# Patient Record
Sex: FEMALE | URBAN - METROPOLITAN AREA
[De-identification: names, ages, dates, MRNs, and addresses within clinical notes are randomized per-mention and may not be internally consistent; named-entity substitution may affect disease eponyms.]

---

## 2017-03-21 ENCOUNTER — EMERGENCY (EMERGENCY)
Facility: HOSPITAL | Age: 57
LOS: 1 days | Discharge: HOME | End: 2017-03-21
Attending: EMERGENCY MEDICINE
Payer: COMMERCIAL

## 2017-03-21 VITALS
HEIGHT: 59.5 IN | WEIGHT: 123.02 LBS | SYSTOLIC BLOOD PRESSURE: 134 MMHG | HEART RATE: 66 BPM | TEMPERATURE: 98 F | OXYGEN SATURATION: 97 % | DIASTOLIC BLOOD PRESSURE: 78 MMHG | RESPIRATION RATE: 20 BRPM

## 2017-03-21 DIAGNOSIS — Z91.018 ALLERGY TO OTHER FOODS: ICD-10-CM

## 2017-03-21 DIAGNOSIS — S61.230A PUNCTURE WOUND WITHOUT FOREIGN BODY OF RIGHT INDEX FINGER WITHOUT DAMAGE TO NAIL, INITIAL ENCOUNTER: ICD-10-CM
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
## 2017-03-21 PROCEDURE — 80053 COMPREHEN METABOLIC PANEL: CPT

## 2017-03-21 PROCEDURE — 90715 TDAP VACCINE 7 YRS/> IM: CPT

## 2017-03-21 PROCEDURE — 85027 COMPLETE CBC AUTOMATED: CPT

## 2017-03-21 PROCEDURE — 99284 EMERGENCY DEPT VISIT MOD MDM: CPT

## 2017-03-21 PROCEDURE — 80074 ACUTE HEPATITIS PANEL: CPT

## 2017-03-21 PROCEDURE — 36415 COLL VENOUS BLD VENIPUNCTURE: CPT

## 2017-03-21 PROCEDURE — 96372 THER/PROPH/DIAG INJ SC/IM: CPT

## 2017-03-21 PROCEDURE — 86703 HIV-1/HIV-2 1 RESULT ANTBDY: CPT

## 2017-03-21 PROCEDURE — 90471 IMMUNIZATION ADMIN: CPT

## 2017-03-21 PROCEDURE — 90746 HEPB VACCINE 3 DOSE ADULT IM: CPT

## 2017-03-21 PROCEDURE — 99053 MED SERV 10PM-8AM 24 HR FAC: CPT

## 2017-03-21 PROCEDURE — 99284 EMERGENCY DEPT VISIT MOD MDM: CPT | Mod: 25

## 2017-03-21 PROCEDURE — 90371 HEP B IG IM: CPT

## 2017-03-21 RX ORDER — TETANUS TOXOID, REDUCED DIPHTHERIA TOXOID AND ACELLULAR PERTUSSIS VACCINE, ADSORBED 5; 2.5; 8; 8; 2.5 [IU]/.5ML; [IU]/.5ML; UG/.5ML; UG/.5ML; UG/.5ML
0.5 SUSPENSION INTRAMUSCULAR ONCE
Qty: 0 | Refills: 0 | Status: COMPLETED | OUTPATIENT
Start: 2017-03-21 | End: 2017-03-21

## 2017-03-21 RX ORDER — HEPATITIS B VIRUS VACCINE,RECB 20 MCG/ML
10 VIAL (ML) INTRAMUSCULAR ONCE
Qty: 0 | Refills: 0 | Status: COMPLETED | OUTPATIENT
Start: 2017-03-21 | End: 2017-03-21

## 2017-03-21 RX ORDER — HEPATITIS B IMMUNE GLOBULIN (HUMAN) 1560 [IU]/5ML
3.4 LIQUID INTRAMUSCULAR ONCE
Qty: 0 | Refills: 0 | Status: COMPLETED | OUTPATIENT
Start: 2017-03-21 | End: 2017-03-21

## 2017-03-21 RX ADMIN — HEPATITIS B IMMUNE GLOBULIN (HUMAN) 3.4 MILLILITER(S): 1560 LIQUID INTRAMUSCULAR at 10:09

## 2017-03-21 RX ADMIN — TETANUS TOXOID, REDUCED DIPHTHERIA TOXOID AND ACELLULAR PERTUSSIS VACCINE, ADSORBED 0.5 MILLILITER(S): 5; 2.5; 8; 8; 2.5 SUSPENSION INTRAMUSCULAR at 09:57

## 2017-03-21 RX ADMIN — Medication 10 MICROGRAM(S): at 10:00

## 2017-03-21 NOTE — ED PROVIDER NOTE - NS ED MD SCRIBE ATTENDING SCRIBE SECTIONS
HISTORY OF PRESENT ILLNESS/VITAL SIGNS( Pullset)/PHYSICAL EXAM/PAST MEDICAL/SURGICAL/SOCIAL HISTORY/DISPOSITION/REVIEW OF SYSTEMS/HIV

## 2017-03-21 NOTE — ED PROVIDER NOTE - OBJECTIVE STATEMENT
55 y/o female with no significant PMHx presents to the ED for fingerstick 1 week ago. 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 Pt reported incident to her employer who advised pt to come to the ED for Hep vaccine. Pt states she was pricked to her R index finger. Pt denies fever, chills, CP, abd pain, n/v/d, or any other complaints. NKDA. Tetanus shot not UTD. 55 y/o female with no significant PMHx presents to the ED for fingerstick 1 week ago. 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 Pt reported incident to her employer who advised pt to come to the ED for Hep bilaterally vaccine. Pt states she was pricked to her R index finger. Pt denies fever, chills, CP, abd pain, n/v/d, or any other complaints. NKDA. Tetanus shot not UTD. Patient does not think she received hepatitisB vaccine in the past

## 2017-03-21 NOTE — ED PROVIDER NOTE - MEDICAL DECISION MAKING DETAILS
Pt with fingerstick 1 week ago. Will start hepatitis series and give tetanus, too late for HIV prophylaxis. Pt with fingerstick 1 week ago. Will start hepatitis series and give tetanus, too late for HIV prophylaxis. Patient instructed to receive 2nd and 3rd doses of hepatitisB vaccine at 1 and 6 months

## 2020-10-16 ENCOUNTER — APPOINTMENT (OUTPATIENT)
Dept: CT IMAGING | Facility: CLINIC | Age: 60
End: 2020-10-16

## 2020-10-16 ENCOUNTER — OUTPATIENT (OUTPATIENT)
Dept: OUTPATIENT SERVICES | Facility: HOSPITAL | Age: 60
LOS: 1 days | End: 2020-10-16

## 2023-03-23 NOTE — ED ADULT TRIAGE NOTE - NS ED NURSE BANDS TYPE
[facility]        NAME: Keena Winston is a 43 y o  female  : 1984    MRN: [id number]  DATE: 2023  TIME: 3:46 PM    Assessment and Plan   Dysuria [R30 0]  1  Dysuria  cephalexin (KEFLEX) 500 mg capsule    POCT urine dip    Urine culture        UA unremarkable however sx are consistent with UTI will start on keflex and send for culture  Patient Instructions     Continue to monitor symptoms  If new or worsening symptoms develop, go immediately to Er  Drink plenty of fluids  Follow up with Family Doctor this week  Chief Complaint     Chief Complaint   Patient presents with   • Possible UTI     States "not peeing" blood  in urine  Increased urgency and frequency  Symptoms present for last week  History of Present Illness       Difficulty Urinating   This is a new problem  The current episode started yesterday  The problem occurs every urination  The problem has been unchanged  The quality of the pain is described as burning  The pain is mild  There has been no fever  Associated symptoms include frequency, hematuria (One episode  Pt not on  menses) and urgency  Pertinent negatives include no chills, flank pain, nausea or vomiting  She has tried nothing for the symptoms  Has had UTIs in the past and this feels identical       Review of Systems   Review of Systems   Constitutional: Negative  Negative for chills, fatigue and fever  HENT: Negative  Eyes: Negative  Respiratory: Negative  Cardiovascular: Negative  Gastrointestinal: Negative for abdominal pain, constipation, diarrhea, nausea and vomiting  Endocrine: Negative  Genitourinary: Positive for dysuria, frequency, hematuria (One episode  Pt not on  menses) and urgency  Negative for flank pain, pelvic pain, vaginal bleeding, vaginal discharge and vaginal pain  Musculoskeletal: Negative for back pain, gait problem, neck pain and neck stiffness  Skin: Negative  Negative for pallor and rash  Allergic/Immunologic: Negative  Neurological: Negative  Negative for weakness and numbness  Hematological: Negative  Psychiatric/Behavioral: Negative  Current Medications       Current Outpatient Medications:   •  cephalexin (KEFLEX) 500 mg capsule, Take 1 capsule (500 mg total) by mouth every 12 (twelve) hours for 7 days, Disp: 14 capsule, Rfl: 0  •  hydrOXYzine HCL (ATARAX) 10 mg tablet, Take 1 tablet (10 mg total) by mouth every 6 (six) hours as needed for itching, Disp: 60 tablet, Rfl: 0  •  linaCLOtide (Linzess) 72 MCG CAPS, Take 1 capsule by mouth daily before breakfast, Disp: , Rfl:   •  loratadine (CLARITIN) 10 mg tablet, Take 10 mg by mouth daily, Disp: , Rfl:   •  Lyrica 200 MG capsule, Take 1 capsule (200 mg total) by mouth 2 (two) times a day, Disp: 60 capsule, Rfl: 0  •  meclizine (ANTIVERT) 12 5 MG tablet, Take 12 5 mg by mouth 2 (two) times a day, Disp: , Rfl:   •  metroNIDAZOLE (METROGEL) 0 75 % gel, Apply topically 2 (two) times a day, Disp: 45 g, Rfl: 0  •  Multiple Vitamin (MULTI-VITAMIN PO), Take by mouth, Disp: , Rfl:   •  pantoprazole (PROTONIX) 40 mg tablet, Take 1 tablet (40 mg total) by mouth 2 (two) times a day, Disp: 60 tablet, Rfl: 0  •  sucralfate (CARAFATE) 1 g/10 mL suspension, TAKE 10ML (1GRAM) BY MOUTH FOUR TIMES A DAY WITH MEALS AND AT BEDTIME, Disp: 414 mL, Rfl: 0  •  Blood Glucose Monitoring Suppl (OneTouch Verio Reflect) w/Device KIT, Check blood sugars three times daily  Please substitute with appropriate alternative as covered by patient's insurance   Dx: E11 65, Disp: 1 kit, Rfl: 0  •  Cyanocobalamin (B-12) 1000 MCG SUBL, PLACE ONE TABLET UNDER THE TONGUE EVERY DAY (Patient not taking: Reported on 3/23/2023), Disp: , Rfl:   •  doxepin (SINEquan) 50 mg capsule, Take 1 capsule (50 mg total) by mouth daily at bedtime, Disp: 30 capsule, Rfl: 2  •  ergocalciferol (VITAMIN D2) 50,000 units, TAKE 1 CAPSULE BY MOUTH ONE TIME PER WEEK (Patient not taking: Reported on 3/23/2023), Disp: , Rfl: 0  •  gatifloxacin (ZYMAXID) 0 5 %, INSTILL 1 DROP INTO RIGHT EYE FOUR TIMES A DAY (Patient not taking: Reported on 3/23/2023), Disp: , Rfl:   •  glucose blood (OneTouch Verio) test strip, Check blood sugars three times daily  Please substitute with appropriate alternative as covered by patient's insurance  Dx: E11 65, Disp: 300 each, Rfl: 3  •  lamoTRIgine (LaMICtal) 100 mg tablet, Take 1 tablet (100 mg total) by mouth daily at bedtime (Patient not taking: Reported on 3/23/2023), Disp: 30 tablet, Rfl: 0  •  lamoTRIgine (LaMICtal) 25 mg tablet, Take 1 tablet (25 mg total) by mouth daily, Disp: 30 tablet, Rfl: 2  •  OneTouch Delica Lancets 72C MISC, Check blood sugars three times daily  Please substitute with appropriate alternative as covered by patient's insurance  Dx: E11 65, Disp: 300 each, Rfl: 3    Current Allergies     Allergies as of 2023   • (No Known Allergies)            The following portions of the patient's history were reviewed and updated as appropriate: allergies, current medications, past family history, past medical history, past social history, past surgical history and problem list      Past Medical History:   Diagnosis Date   • Anemia     iron def   • Chronic pain disorder     feet   • Depression    • Diabetes mellitus ([address] )     neg since gastric bypass   • GERD (gastroesophageal reflux disease)    • Gilbert's syndrome    • Neuropathy     feet sehila   • Vitamin D deficiency        Past Surgical History:   Procedure Laterality Date   •  SECTION      X3   • CHOLECYSTECTOMY     • GASTRIC BYPASS     • HERNIA REPAIR     • ME EXCISION SKIN ABD INFRAUMBILICAL PANNICULECTOMY N/A 2020    Procedure: ABDOMINALPLASTY,CIRCUMFERENTIAL;  Surgeon: Nani Vasques MD;  Location: [address] [place] [address];  Service: Plastics       Family History   Problem Relation Age of Onset   • Mental illness Mother    • Drug abuse Mother          Medications have been verified          Objective   BP 132/79 (BP Location: Right arm, Patient Position: Sitting, Cuff Size: Standard)   Pulse 85   Temp 97 7 °F (36 5 °C) (Temporal)   Resp 18   SpO2 98%        Physical Exam     Physical Exam  Vitals and nursing note reviewed  Constitutional:       General: She is not in acute distress  Appearance: Normal appearance  She is well-developed  She is not ill-appearing or diaphoretic  HENT:      Head: Normocephalic and atraumatic  Cardiovascular:      Rate and Rhythm: Normal rate and regular rhythm  Heart sounds: Normal heart sounds  Pulmonary:      Effort: Pulmonary effort is normal  No respiratory distress  Breath sounds: Normal breath sounds  No wheezing, rhonchi or rales  Abdominal:      General: Bowel sounds are normal  There is no distension  Palpations: Abdomen is soft  Tenderness: There is no abdominal tenderness  There is no right CVA tenderness, left CVA tenderness, guarding or rebound  Skin:     General: Skin is warm  Capillary Refill: Capillary refill takes less than 2 seconds  Coloration: Skin is not pale  Findings: No rash  Neurological:      Mental Status: She is alert  Name band;